# Patient Record
Sex: FEMALE | Race: OTHER | HISPANIC OR LATINO | URBAN - METROPOLITAN AREA
[De-identification: names, ages, dates, MRNs, and addresses within clinical notes are randomized per-mention and may not be internally consistent; named-entity substitution may affect disease eponyms.]

---

## 2022-07-27 ENCOUNTER — APPOINTMENT (OUTPATIENT)
Dept: URBAN - METROPOLITAN AREA CLINIC 285 | Age: 62
Setting detail: DERMATOLOGY
End: 2022-07-27

## 2022-07-27 DIAGNOSIS — L28.1 PRURIGO NODULARIS: ICD-10-CM

## 2022-07-27 PROCEDURE — OTHER MEDICATION COUNSELING: OTHER

## 2022-07-27 PROCEDURE — 99203 OFFICE O/P NEW LOW 30 MIN: CPT

## 2022-07-27 PROCEDURE — OTHER COUNSELING: OTHER

## 2022-07-27 PROCEDURE — OTHER TREATMENT REGIMEN: OTHER

## 2022-07-27 PROCEDURE — OTHER MIPS QUALITY: OTHER

## 2022-07-27 ASSESSMENT — LOCATION DETAILED DESCRIPTION DERM
LOCATION DETAILED: LEFT LATERAL ABDOMEN
LOCATION DETAILED: LEFT VENTRAL PROXIMAL FOREARM
LOCATION DETAILED: LEFT DISTAL PRETIBIAL REGION
LOCATION DETAILED: RIGHT PROXIMAL PRETIBIAL REGION
LOCATION DETAILED: LEFT SUPERIOR MEDIAL LOWER BACK
LOCATION DETAILED: RIGHT VENTRAL PROXIMAL FOREARM
LOCATION DETAILED: LEFT SUPERIOR UPPER BACK

## 2022-07-27 ASSESSMENT — LOCATION SIMPLE DESCRIPTION DERM
LOCATION SIMPLE: ABDOMEN
LOCATION SIMPLE: RIGHT FOREARM
LOCATION SIMPLE: LEFT PRETIBIAL REGION
LOCATION SIMPLE: LEFT FOREARM
LOCATION SIMPLE: RIGHT PRETIBIAL REGION
LOCATION SIMPLE: LEFT LOWER BACK
LOCATION SIMPLE: LEFT UPPER BACK

## 2022-07-27 ASSESSMENT — LOCATION ZONE DERM
LOCATION ZONE: ARM
LOCATION ZONE: LEG
LOCATION ZONE: TRUNK

## 2022-07-27 NOTE — PROCEDURE: TREATMENT REGIMEN
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 Patient has tried numerous treatments (cellcept x 3 months, topical steroids, po steroids, NAC) without improvement. Pt not interested in Gabapentin as her mother was addicted. She is currently taking anti-anxiety medication. 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 Recommend trying dupixent in future if no improvement w/ Rituximab. Pt to fill out paperwork today and will send PA over to insurance to see coverage options.
Detail Level: Zone
Initiate Treatment: Dupixent 300mg SC q2 weeks

## 2022-07-27 NOTE — HPI: SKIN LESION
How Severe Is Your Skin Lesion?: moderate
Is This A New Presentation, Or A Follow-Up?: Skin Lesions
Additional History: Previously biopsy\\nTried cellcept, topical steroids,

## 2022-08-04 ENCOUNTER — RX ONLY (RX ONLY)
Age: 62
End: 2022-08-04

## 2022-08-04 RX ORDER — DUPILUMAB 300 MG/2ML
INJECTION, SOLUTION SUBCUTANEOUS
Qty: 4 | Refills: 3 | Status: ERX | COMMUNITY
Start: 2022-08-04

## 2022-09-16 NOTE — PROCEDURE: MEDICATION COUNSELING
Addended by: ELISHA BLAKELY on: 9/16/2022 08:46 AM     Modules accepted: Orders     evelyn all pertinent systems normal Propranolol Pregnancy And Lactation Text: This medication is Pregnancy Category C and it isn't known if it is safe during pregnancy. It is excreted in breast milk.

## 2022-09-30 ENCOUNTER — RX ONLY (RX ONLY)
Age: 62
End: 2022-09-30

## 2022-09-30 ENCOUNTER — APPOINTMENT (OUTPATIENT)
Dept: URBAN - METROPOLITAN AREA CLINIC 231 | Age: 62
Setting detail: DERMATOLOGY
End: 2022-09-30

## 2022-09-30 DIAGNOSIS — L28.1 PRURIGO NODULARIS: ICD-10-CM

## 2022-09-30 PROCEDURE — OTHER COUNSELING: OTHER

## 2022-09-30 PROCEDURE — 99214 OFFICE O/P EST MOD 30 MIN: CPT

## 2022-09-30 PROCEDURE — OTHER ORDER TESTS: OTHER

## 2022-09-30 PROCEDURE — OTHER TREATMENT REGIMEN: OTHER

## 2022-09-30 PROCEDURE — OTHER MEDICATION COUNSELING: OTHER

## 2022-09-30 PROCEDURE — OTHER PRESCRIPTION: OTHER

## 2022-09-30 RX ORDER — CLOBETASOL PROPIONATE 0.5 MG/ML
SOLUTION TOPICAL
Qty: 50 | Refills: 0 | Status: ERX | COMMUNITY
Start: 2022-09-30

## 2022-09-30 RX ORDER — DUPILUMAB 300 MG/2ML
INJECTION, SOLUTION SUBCUTANEOUS
Qty: 1 | Refills: 2 | Status: ACTIVE

## 2022-09-30 RX ORDER — DUPILUMAB 300 MG/2ML
INJECTION, SOLUTION SUBCUTANEOUS
Qty: 4 | Refills: 0 | Status: ACTIVE

## 2022-09-30 RX ORDER — HYDROXYZINE HYDROCHLORIDE 10 MG/1
TABLET, FILM COATED ORAL
Qty: 30 | Refills: 0 | Status: ERX | COMMUNITY
Start: 2022-09-30

## 2022-09-30 RX ORDER — MUPIROCIN 20 MG/G
OINTMENT TOPICAL
Qty: 22 | Refills: 0 | Status: ERX | COMMUNITY
Start: 2022-09-30

## 2022-09-30 ASSESSMENT — LOCATION ZONE DERM
LOCATION ZONE: ARM
LOCATION ZONE: TRUNK
LOCATION ZONE: LEG

## 2022-09-30 ASSESSMENT — LOCATION SIMPLE DESCRIPTION DERM
LOCATION SIMPLE: RIGHT PRETIBIAL REGION
LOCATION SIMPLE: ABDOMEN
LOCATION SIMPLE: LEFT UPPER BACK
LOCATION SIMPLE: LEFT PRETIBIAL REGION
LOCATION SIMPLE: LEFT LOWER BACK
LOCATION SIMPLE: LEFT FOREARM
LOCATION SIMPLE: RIGHT FOREARM

## 2022-09-30 ASSESSMENT — LOCATION DETAILED DESCRIPTION DERM
LOCATION DETAILED: RIGHT PROXIMAL PRETIBIAL REGION
LOCATION DETAILED: LEFT LATERAL ABDOMEN
LOCATION DETAILED: LEFT VENTRAL PROXIMAL FOREARM
LOCATION DETAILED: LEFT SUPERIOR UPPER BACK
LOCATION DETAILED: LEFT SUPERIOR MEDIAL LOWER BACK
LOCATION DETAILED: RIGHT VENTRAL PROXIMAL FOREARM
LOCATION DETAILED: LEFT DISTAL PRETIBIAL REGION

## 2022-09-30 NOTE — PROCEDURE: MEDICATION COUNSELING
I have reviewed discharge instructions with the patient. The patient verbalized understanding. Prescriptions given to patient. Home health not arranged, patient refused. Pain medication changed. No further questions at this time. Ketoconazole Pregnancy And Lactation Text: This medication is Pregnancy Category C and it isn't know if it is safe during pregnancy. It is also excreted in breast milk and breast feeding isn't recommended.

## 2022-10-20 ENCOUNTER — RX ONLY (RX ONLY)
Age: 62
End: 2022-10-20

## 2022-10-20 RX ORDER — HYDROXYZINE HYDROCHLORIDE 10 MG/1
TABLET, FILM COATED ORAL
Qty: 30 | Refills: 0 | Status: ERX

## 2022-11-04 ENCOUNTER — APPOINTMENT (OUTPATIENT)
Dept: URBAN - METROPOLITAN AREA CLINIC 231 | Age: 62
Setting detail: DERMATOLOGY
End: 2022-11-04

## 2022-11-04 DIAGNOSIS — L28.1 PRURIGO NODULARIS: ICD-10-CM

## 2022-11-04 PROCEDURE — OTHER ADDITIONAL NOTES: OTHER

## 2022-11-04 PROCEDURE — OTHER TREATMENT REGIMEN: OTHER

## 2022-11-04 PROCEDURE — OTHER MEDICATION COUNSELING: OTHER

## 2022-11-04 PROCEDURE — 99213 OFFICE O/P EST LOW 20 MIN: CPT

## 2022-11-04 PROCEDURE — OTHER COUNSELING: OTHER

## 2022-11-04 ASSESSMENT — LOCATION DETAILED DESCRIPTION DERM
LOCATION DETAILED: LEFT VENTRAL PROXIMAL FOREARM
LOCATION DETAILED: LEFT LATERAL ABDOMEN
LOCATION DETAILED: LEFT SUPERIOR MEDIAL LOWER BACK
LOCATION DETAILED: LEFT SUPERIOR UPPER BACK
LOCATION DETAILED: RIGHT VENTRAL PROXIMAL FOREARM
LOCATION DETAILED: LEFT DISTAL PRETIBIAL REGION
LOCATION DETAILED: RIGHT PROXIMAL PRETIBIAL REGION

## 2022-11-04 ASSESSMENT — LOCATION SIMPLE DESCRIPTION DERM
LOCATION SIMPLE: LEFT PRETIBIAL REGION
LOCATION SIMPLE: ABDOMEN
LOCATION SIMPLE: LEFT FOREARM
LOCATION SIMPLE: RIGHT PRETIBIAL REGION
LOCATION SIMPLE: LEFT UPPER BACK
LOCATION SIMPLE: RIGHT FOREARM
LOCATION SIMPLE: LEFT LOWER BACK

## 2022-11-04 ASSESSMENT — LOCATION ZONE DERM
LOCATION ZONE: ARM
LOCATION ZONE: LEG
LOCATION ZONE: TRUNK

## 2022-11-04 NOTE — PROCEDURE: ADDITIONAL NOTES
Additional Notes: Patient is seeing a rheumatologist on 11/9 at [facility] Additional Notes: Patient is seeing a rheumatologist on 11/9 at [facility]

## 2022-11-04 NOTE — PROCEDURE: MEDICATION COUNSELING
January 30, 2020      Adams Rodriguez       [address] [place] [address]      Dear Adams,    There’s no question about it - preventive care can save lives or can help stop a disease process in its tracks. Many health problems start out silently without symptoms. Preventive care is often the only way to catch these problems in early stages, when they can be more successfully treated.    Our records show that you are due for the following tests or immunizations. If you have had these tests or immunizations done, please call us so we can update your record.    · Colonoscopy: Colorectal cancer usually comes from polyps (abnormal growths) in the colon or rectum. Colonoscopy can find the polyps and remove them before they turn to cancer. To schedule your Colonoscopy, please call: [phone]       Your good health is important to us. Please feel free to call my office at [phone] with any questions.    Sincerely,         Maria Luisa Chavarria MD  [facility] [place] [id number] [phone]     [facility] offers online access to your health information via [email or web address]   By registering for [employer] you will be able to view test results, pay your bill, send messages to your care team (not for immediate response), refill prescriptions, schedule and verify appointments.     Topical Clindamycin Counseling: Patient counseled that this medication may cause skin irritation or allergic reactions.  In the event of skin irritation, the patient was advised to reduce the amount of the drug applied or use it less frequently.   The patient verbalized understanding of the proper use and possible adverse effects of clindamycin.  All of the patient's questions and concerns were addressed.

## 2022-11-04 NOTE — PROCEDURE: TREATMENT REGIMEN
Detail Level: Zone
Plan: Pt states she has not been the most compliant with medications.  She would like to hold off on any further treatment until after her appt. with her rheumatologist next week. \\n\\nBriefly discussed an oral abx, such as Doxycycline due to significant excoriations. Pt states she will call after her appt. if should like this sent in.
Continue Regimen: clobetasol 0.05 % scalp solution \\nQuantity: 50.0 ml\\nSig: Mix 50/50 cerave moisturizing cream apply to affected areas BID PRN flares.\\n\\nhydroxyzine HCl 10 mg tablet \\nQuantity: 30.0 Tablet\\nSig: Take 1 tablet QPM PRN itch\\n\\nmupirocin 2 % topical ointment \\nQuantity: 22.0 g  Days Supply: 30\\nSig: Apply to affected open areas twice daily.

## 2022-11-09 ENCOUNTER — RX ONLY (RX ONLY)
Age: 62
End: 2022-11-09

## 2022-11-09 RX ORDER — DUPILUMAB 300 MG/2ML
INJECTION, SOLUTION SUBCUTANEOUS
Qty: 1 | Refills: 2 | Status: ERX

## 2022-11-16 ENCOUNTER — RX ONLY (RX ONLY)
Age: 62
End: 2022-11-16

## 2022-11-16 RX ORDER — HYDROXYZINE HYDROCHLORIDE 10 MG/1
TABLET, FILM COATED ORAL
Qty: 30 | Refills: 0 | Status: ERX

## 2022-12-08 ENCOUNTER — RX ONLY (RX ONLY)
Age: 62
End: 2022-12-08

## 2022-12-08 RX ORDER — DUPILUMAB 300 MG/2ML
INJECTION, SOLUTION SUBCUTANEOUS
Qty: 1 | Refills: 2 | Status: ERX

## 2023-02-10 ENCOUNTER — RX ONLY (RX ONLY)
Age: 63
End: 2023-02-10

## 2023-02-10 RX ORDER — HYDROXYZINE HYDROCHLORIDE 10 MG/1
TABLET, FILM COATED ORAL
Qty: 30 | Refills: 1 | Status: ERX

## 2024-04-24 NOTE — PROCEDURE: MEDICATION COUNSELING
Adult SSKI Counseling:  I discussed with the patient the risks of SSKI including but not limited to thyroid abnormalities, metallic taste, GI upset, fever, headache, acne, arthralgias, paraesthesias, lymphadenopathy, easy bleeding, arrhythmias, and allergic reaction.